# Patient Record
Sex: MALE | NOT HISPANIC OR LATINO | URBAN - METROPOLITAN AREA
[De-identification: names, ages, dates, MRNs, and addresses within clinical notes are randomized per-mention and may not be internally consistent; named-entity substitution may affect disease eponyms.]

---

## 2020-06-03 ENCOUNTER — OFFICE VISIT (OUTPATIENT)
Dept: ENDOCRINOLOGY | Facility: CLINIC | Age: 60
End: 2020-06-03

## 2020-06-03 VITALS
WEIGHT: 242.6 LBS | DIASTOLIC BLOOD PRESSURE: 84 MMHG | BODY MASS INDEX: 33.96 KG/M2 | HEIGHT: 71 IN | SYSTOLIC BLOOD PRESSURE: 142 MMHG | HEART RATE: 75 BPM | OXYGEN SATURATION: 100 %

## 2020-06-03 DIAGNOSIS — E66.09 CLASS 1 OBESITY DUE TO EXCESS CALORIES WITH SERIOUS COMORBIDITY AND BODY MASS INDEX (BMI) OF 33.0 TO 33.9 IN ADULT: ICD-10-CM

## 2020-06-03 DIAGNOSIS — IMO0002 DIABETES MELLITUS TYPE 2, UNCONTROLLED, WITH COMPLICATIONS: Primary | ICD-10-CM

## 2020-06-03 DIAGNOSIS — R29.818 SUSPECTED SLEEP APNEA: ICD-10-CM

## 2020-06-03 LAB
GLUCOSE BLDC GLUCOMTR-MCNC: 327 MG/DL (ref 70–130)
HBA1C MFR BLD: 12.3 %

## 2020-06-03 PROCEDURE — 99244 OFF/OP CNSLTJ NEW/EST MOD 40: CPT | Performed by: INTERNAL MEDICINE

## 2020-06-03 PROCEDURE — 83036 HEMOGLOBIN GLYCOSYLATED A1C: CPT | Performed by: INTERNAL MEDICINE

## 2020-06-03 PROCEDURE — 82947 ASSAY GLUCOSE BLOOD QUANT: CPT | Performed by: INTERNAL MEDICINE

## 2020-06-03 RX ORDER — PANTOPRAZOLE SODIUM 40 MG/1
TABLET, DELAYED RELEASE ORAL
COMMUNITY
Start: 2020-05-28

## 2020-06-03 RX ORDER — FENOFIBRATE 145 MG/1
TABLET, COATED ORAL
COMMUNITY
Start: 2020-05-28

## 2020-06-03 RX ORDER — INSULIN HUMAN 500 [IU]/ML
62 INJECTION, SOLUTION SUBCUTANEOUS
COMMUNITY
Start: 2020-03-05 | End: 2020-06-03 | Stop reason: SDUPTHER

## 2020-06-03 RX ORDER — SITAGLIPTIN 100 MG/1
TABLET, FILM COATED ORAL
COMMUNITY
Start: 2020-04-27 | End: 2020-06-03

## 2020-06-03 RX ORDER — TRAMADOL HYDROCHLORIDE 50 MG/1
TABLET ORAL
COMMUNITY
Start: 2020-05-28

## 2020-06-03 RX ORDER — IBUPROFEN 800 MG/1
TABLET ORAL
COMMUNITY
Start: 2020-04-27

## 2020-06-03 RX ORDER — INSULIN HUMAN 500 [IU]/ML
INJECTION, SOLUTION SUBCUTANEOUS
Qty: 24 PEN | Refills: 1 | Status: SHIPPED | OUTPATIENT
Start: 2020-06-03

## 2020-06-03 RX ORDER — MESALAMINE 1.2 G/1
TABLET, DELAYED RELEASE ORAL
COMMUNITY
Start: 2020-05-07

## 2020-06-03 RX ORDER — PROMETHAZINE HYDROCHLORIDE 25 MG/1
TABLET ORAL
COMMUNITY
Start: 2020-04-27

## 2020-06-03 RX ORDER — BACLOFEN 10 MG/1
TABLET ORAL
COMMUNITY
Start: 2020-03-05

## 2020-06-03 RX ORDER — TAMSULOSIN HYDROCHLORIDE 0.4 MG/1
CAPSULE ORAL
COMMUNITY
Start: 2020-04-27

## 2020-06-03 RX ORDER — INSULIN GLARGINE 100 [IU]/ML
50 INJECTION, SOLUTION SUBCUTANEOUS NIGHTLY
COMMUNITY
Start: 2020-04-01 | End: 2020-06-03

## 2020-06-03 NOTE — PATIENT INSTRUCTIONS
Discontinue januvia and basaglar.     Increase U-500 to 90 units three times daily before meals. Increase the dose by 10 units twice a week until your glucose levels are consistently <200.     Start trulicity 0.75 mg once a week. If you tolerate this, call the office and we will send in a prescription. If the stomach side effects are not tolerable or do not improve after use, you may discontinue use.

## 2020-06-03 NOTE — PROGRESS NOTES
Chief Complaint   Patient presents with   • Diabetes     New Patient - Type 2 Diabetes        Referring Provider  Nelson Martinez MD     HPI   Jorge Levi is a 59 y.o. male had concerns including Diabetes (New Patient - Type 2 Diabetes).    Diabetes was diagnosed 20 years ago, on insulin for about 10 years.  Complications include neuropathy.  Last ophtho exam was 2015.  Current medications for diabetes include januvia, basaglar 50 units at night, U-500 65 units before meals. He takes additional 5 units if BGs are above 250.   He misses a dose of insulin maybe once a week.   Was on metformin in the past but was taken off by PCP - not sure why he was taken off. He was tolerating the metformin.   He checks his blood sugar 1-3 times per day. Average BGs in the mid to high 300s.    He hasn't been on a GLP-1 agonist. He has no history of pancreatitis. No known family history of MEN or MTC.     Pt has Crohn's disease and has nausea and vomiting on occasion. Is following with GI. Pt also has bloating.     Diet:   Breakfast: 2 eggs and 2 slices of toast (white) or boiled egg, fruit (bananas, strawberries, grapes, cantaloupes, watermelon)  Lunch: sandwiches, salads  Dinner: meat, sides, green beans, peas, corn, salad, no bread with dinner  Snacks: 1 donut or small cake   Drinks: lemonade (1 glass per day), water, rarely coke or koolaid    He used to weigh >300 lbs. Has cut back on bread.     Patient complains of fatigue.  He has nighttime snoring, daytime fatigue.  Wakes with a headache several times per week.  He has not been screened for sleep apnea.    Past Medical History:   Diagnosis Date   • Crohn disease (CMS/HCC)    • Depression    • Diabetes mellitus (CMS/HCC)    • Hypertension    • Type 2 diabetes mellitus (CMS/HCC)      Past Surgical History:   Procedure Laterality Date   • DENTAL PROCEDURE     • FOOT SURGERY Right    • TOE SURGERY        Family History   Problem Relation Age of Onset   • Diabetes Mother    •  Diabetes Father    • Diabetes Sister    • Diabetes Brother    • Cancer Brother       Social History     Socioeconomic History   • Marital status:      Spouse name: Not on file   • Number of children: Not on file   • Years of education: Not on file   • Highest education level: Not on file   Tobacco Use   • Smoking status: Current Every Day Smoker     Packs/day: 0.50     Types: Cigarettes   • Smokeless tobacco: Never Used   Substance and Sexual Activity   • Alcohol use: No   • Drug use: No   • Sexual activity: Defer      Allergies   Allergen Reactions   • Penicillins Swelling     Nose and lips   • Sulfa Antibiotics Hives      Current Outpatient Medications on File Prior to Visit   Medication Sig Dispense Refill   • amitriptyline (ELAVIL) 100 MG tablet Take 100 mg by mouth every night.     • baclofen (LIORESAL) 10 MG tablet      • escitalopram (LEXAPRO) 20 MG tablet Take 20 mg by mouth daily.     • fenofibrate (TRICOR) 145 MG tablet      • gabapentin (NEURONTIN) 800 MG tablet Take 800 mg by mouth 3 (three) times a day.     • hydrochlorothiazide (HYDRODIURIL) 50 MG tablet Take 50 mg by mouth daily.     • ibuprofen (ADVIL,MOTRIN) 800 MG tablet      • mesalamine (LIALDA) 1.2 g EC tablet      • pantoprazole (PROTONIX) 40 MG EC tablet      • promethazine (PHENERGAN) 25 MG tablet      • quinapril (ACCUPRIL) 20 MG tablet Take 20 mg by mouth every night.     • tamsulosin (FLOMAX) 0.4 MG capsule 24 hr capsule      • traMADol (ULTRAM) 50 MG tablet      • TRIMETHOPRIM HCL PO Take  by mouth.     • [DISCONTINUED] HUMULIN R U-500 KWIKPEN 500 UNIT/ML solution pen-injector CONCENTRATED injection Inject 62 Units under the skin into the appropriate area as directed 3 (Three) Times a Day Before Meals.     • [DISCONTINUED] Insulin Glargine (BASAGLAR KWIKPEN) 100 UNIT/ML injection pen Inject 50 Units under the skin into the appropriate area as directed Every Night.     • [DISCONTINUED] JANUVIA 100 MG tablet      • [DISCONTINUED]  "insulin aspart (NovoLOG) 100 UNIT/ML injection Inject  under the skin 3 (three) times a day before meals.     • [DISCONTINUED] metFORMIN (GLUCOPHAGE) 1000 MG tablet Take 1,000 mg by mouth 2 (two) times a day with meals.       No current facility-administered medications on file prior to visit.         Review of Systems   Constitutional: Positive for appetite change and fatigue.   HENT: Negative.    Eyes: Negative.    Respiratory: Negative.    Cardiovascular: Negative.    Gastrointestinal: Positive for nausea and vomiting.   Endocrine: Negative.    Genitourinary: Negative.    Musculoskeletal: Negative.    Skin: Negative.    Allergic/Immunologic: Negative.    Neurological: Negative.    Hematological: Negative.    Psychiatric/Behavioral: Negative.       ROS was reviewed and verified. All other ROS negative.    /84 (BP Location: Right arm, Patient Position: Sitting, Cuff Size: Adult)   Pulse 75   Ht 180.3 cm (71\")   Wt 110 kg (242 lb 9.6 oz)   SpO2 100%   BMI 33.84 kg/m²      Physical Exam    Jorge had a diabetic foot exam performed today.   During the foot exam he had a monofilament test performed (absent monofilament up to the knee).  Vascular Status -  His right foot exhibits abnormal foot edema. His left foot exhibits abnormal foot edema.  Skin Integrity  -  His right foot skin is intact.His left foot skin is intact..    Constitutional:  well developed; well nourished  no acute distress  obese - Body mass index is 33.84 kg/m².   ENT/Thyroid: no thyromegaly  no palpable nodules   Eyes: EOM intact  Conjunctiva: clear   Respiratory:  breathing is unlabored  clear to auscultation bilaterally   Cardiovascular:  regular rate and rhythm, S1, S2 normal, no murmur, click, rub or gallop   Chest:  Not performed.   Abdomen: Not performed.   : Not performed.   Musculoskeletal: negative findings:  ROM of all joints is normal, no deformities present   Skin: dry and warm   Neuro: normal without focal findings, mental " status, speech normal, alert and oriented x3 and CHIRAG   Psych: oriented to time, place and person, mood and affect are within normal limits     HbA1c:  Lab Results   Component Value Date    HGBA1C 12.3 06/03/2020     Glucose:  Lab Results   Component Value Date    POCGLU 327 (A) 06/03/2020       Assessment and Plan    Jorge was seen today for diabetes.    Diagnoses and all orders for this visit:    Diabetes mellitus type 2, uncontrolled, with complications (CMS/[facility])  Uncontrolled with A1c up to 12.3 from 10.5 in December and complicated by neuropathy.  Discontinue Januvia and Basaglar.  Basal insulin is typically not used in conjunction with U-500.  Januvia has little impact on blood sugar control when on high doses of insulin.  Increase U-500 insulin to 90 units 3 times daily and titrate by 10 units twice weekly until BG's are less than 200.  Samples of Trulicity 0.75 mg were given to the patient.  If he does not tolerate due to GI side effects, will discontinue.  He has Crohn's and already has some nausea and vomiting on occasion.  Request labs from PCP.  It is unclear why the metformin was discontinued.  If creatinine and GFR are normal, metformin will be resumed.  Monofilament was updated today, is absent.  Ophtho exam is overdue, last in 2015.  Patient was encouraged to schedule.  Call the office if BG's are not improving despite dose increase of insulin.  -     POC Glucose  -     POC Glycosylated Hemoglobin (Hb A1C)  -     HUMULIN R U-500 KWIKPEN 500 UNIT/ML solution pen-injector CONCENTRATED injection; 90 units three times daily about 30 minutes prior to your meal, increase to MDD of 400  -     Dulaglutide (Trulicity) 0.75 MG/0.5ML solution pen-injector; Inject 0.75 mg under the skin into the appropriate area as directed Every 7 (Seven) Days.    Class 1 obesity due to excess calories with serious comorbidity and body mass index (BMI) of 33.0 to 33.9 in adult  BMI 33.8.  Weight loss is necessary for diabetes  control and overall health.  Discussed some dietary modifications including eliminating regular sweetened beverages, decreasing fruit intake, healthier snack options discussed.    Suspected sleep apnea  Daytime fatigue, snoring, morning headaches.  Sleep referral placed.  Untreated sleep apnea increases risk of cardiovascular disease, strokes, insulin resistance, weight gain.  -     Ambulatory Referral to Sleep Medicine      **Addendum 6/3/2020:    Labs received from PCPs office.  CBC from January 16, 2020 showed hemoglobin slightly low at 13.5, CMP with creatinine 1.4 and GFR 55.  Do not resume metformin at this time.  Recheck to make sure GFR stable at follow-up. Pt will also need lipid and UMACR checked.     Return in about 3 months (around 9/3/2020) for next scheduled follow up. The patient was instructed to contact the clinic with any interval questions or concerns.    Susie Ghosh DO   Endocrinologist    Please note that portions of this document were completed with a voice recognition program. Efforts were made to edit the dictations, but occasionally words are mis-transcribed.

## 2020-06-12 ENCOUNTER — TELEPHONE (OUTPATIENT)
Dept: ENDOCRINOLOGY | Facility: CLINIC | Age: 60
End: 2020-06-12

## 2020-06-12 NOTE — TELEPHONE ENCOUNTER
VIKTORIYAM and told pt message below and that Dr Ghosh will be back in the office on Monday. Told pt to call with any further questions or concerns.

## 2020-06-12 NOTE — TELEPHONE ENCOUNTER
Patient was seen last week and started taking Trulicity. It is making him really sick to his stomach and giving him bad headaches. Is there another medication pt can take? Please advise. Thanks.

## 2020-06-15 NOTE — TELEPHONE ENCOUNTER
Pt can remain off trulicity. Continue titrating the insulin as discussed in his visit (increasing by 10 units a few times per week until BGs <200s consistently).

## 2022-06-01 ENCOUNTER — HOME HEALTH ADMISSION (OUTPATIENT)
Dept: HOME HEALTH SERVICES | Facility: HOME HEALTHCARE | Age: 62
End: 2022-06-01

## 2022-06-01 ENCOUNTER — TRANSCRIBE ORDERS (OUTPATIENT)
Dept: HOME HEALTH SERVICES | Facility: HOME HEALTHCARE | Age: 62
End: 2022-06-01

## 2022-06-01 DIAGNOSIS — L03.115 CELLULITIS OF RIGHT FOOT: Primary | ICD-10-CM

## 2023-09-06 ENCOUNTER — HOME HEALTH ADMISSION (OUTPATIENT)
Dept: HOME HEALTH SERVICES | Facility: HOME HEALTHCARE | Age: 63
End: 2023-09-06
Payer: MEDICAID

## 2023-09-06 ENCOUNTER — TRANSCRIBE ORDERS (OUTPATIENT)
Dept: HOME HEALTH SERVICES | Facility: HOME HEALTHCARE | Age: 63
End: 2023-09-06
Payer: MEDICAID

## 2023-09-06 DIAGNOSIS — E11.621 DIABETIC ULCER OF LEFT HEEL ASSOCIATED WITH TYPE 2 DIABETES MELLITUS, UNSPECIFIED ULCER STAGE: Primary | ICD-10-CM

## 2023-09-06 DIAGNOSIS — L97.429 DIABETIC ULCER OF LEFT HEEL ASSOCIATED WITH TYPE 2 DIABETES MELLITUS, UNSPECIFIED ULCER STAGE: Primary | ICD-10-CM
